# Patient Record
Sex: MALE | Race: WHITE | NOT HISPANIC OR LATINO
[De-identification: names, ages, dates, MRNs, and addresses within clinical notes are randomized per-mention and may not be internally consistent; named-entity substitution may affect disease eponyms.]

---

## 2020-10-01 ENCOUNTER — NON-APPOINTMENT (OUTPATIENT)
Age: 45
End: 2020-10-01

## 2020-10-01 ENCOUNTER — APPOINTMENT (OUTPATIENT)
Dept: INTERNAL MEDICINE | Facility: CLINIC | Age: 45
End: 2020-10-01
Payer: COMMERCIAL

## 2020-10-01 VITALS
SYSTOLIC BLOOD PRESSURE: 112 MMHG | DIASTOLIC BLOOD PRESSURE: 72 MMHG | WEIGHT: 214 LBS | HEIGHT: 67 IN | BODY MASS INDEX: 33.59 KG/M2 | TEMPERATURE: 97 F | OXYGEN SATURATION: 99 % | HEART RATE: 81 BPM

## 2020-10-01 DIAGNOSIS — Z80.0 FAMILY HISTORY OF MALIGNANT NEOPLASM OF DIGESTIVE ORGANS: ICD-10-CM

## 2020-10-01 DIAGNOSIS — Z13.1 ENCOUNTER FOR SCREENING FOR DIABETES MELLITUS: ICD-10-CM

## 2020-10-01 DIAGNOSIS — Z13.0 ENCOUNTER FOR SCREENING FOR DISEASES OF THE BLOOD AND BLOOD-FORMING ORGANS AND CERTAIN DISORDERS INVOLVING THE IMMUNE MECHANISM: ICD-10-CM

## 2020-10-01 DIAGNOSIS — Z12.5 ENCOUNTER FOR SCREENING FOR MALIGNANT NEOPLASM OF PROSTATE: ICD-10-CM

## 2020-10-01 DIAGNOSIS — Z11.59 ENCOUNTER FOR SCREENING FOR OTHER VIRAL DISEASES: ICD-10-CM

## 2020-10-01 DIAGNOSIS — Z11.4 ENCOUNTER FOR SCREENING FOR HUMAN IMMUNODEFICIENCY VIRUS [HIV]: ICD-10-CM

## 2020-10-01 DIAGNOSIS — Z13.21 ENCOUNTER FOR SCREENING FOR NUTRITIONAL DISORDER: ICD-10-CM

## 2020-10-01 PROCEDURE — 93000 ELECTROCARDIOGRAM COMPLETE: CPT | Mod: 59

## 2020-10-01 PROCEDURE — 90715 TDAP VACCINE 7 YRS/> IM: CPT

## 2020-10-01 PROCEDURE — G0447 BEHAVIOR COUNSEL OBESITY 15M: CPT

## 2020-10-01 PROCEDURE — 36415 COLL VENOUS BLD VENIPUNCTURE: CPT

## 2020-10-01 PROCEDURE — 99386 PREV VISIT NEW AGE 40-64: CPT | Mod: 25

## 2020-10-01 PROCEDURE — 99214 OFFICE O/P EST MOD 30 MIN: CPT | Mod: 25

## 2020-10-01 PROCEDURE — 90686 IIV4 VACC NO PRSV 0.5 ML IM: CPT

## 2020-10-01 PROCEDURE — 90471 IMMUNIZATION ADMIN: CPT

## 2020-10-01 PROCEDURE — 90472 IMMUNIZATION ADMIN EACH ADD: CPT

## 2020-10-01 PROCEDURE — G0444 DEPRESSION SCREEN ANNUAL: CPT

## 2020-10-01 PROCEDURE — G0442 ANNUAL ALCOHOL SCREEN 15 MIN: CPT

## 2020-10-01 PROCEDURE — 90732 PPSV23 VACC 2 YRS+ SUBQ/IM: CPT

## 2020-10-02 LAB
25(OH)D3 SERPL-MCNC: 16.4 NG/ML
ALBUMIN SERPL ELPH-MCNC: 4.6 G/DL
ALP BLD-CCNC: 79 U/L
ALT SERPL-CCNC: 38 U/L
ANION GAP SERPL CALC-SCNC: 15 MMOL/L
AST SERPL-CCNC: 20 U/L
BASOPHILS # BLD AUTO: 0.08 K/UL
BASOPHILS NFR BLD AUTO: 1.3 %
BILIRUB SERPL-MCNC: 0.4 MG/DL
BUN SERPL-MCNC: 16 MG/DL
CALCIUM SERPL-MCNC: 9.4 MG/DL
CHLORIDE SERPL-SCNC: 101 MMOL/L
CHOLEST SERPL-MCNC: 227 MG/DL
CHOLEST/HDLC SERPL: 6.8 RATIO
CO2 SERPL-SCNC: 26 MMOL/L
CREAT SERPL-MCNC: 1.1 MG/DL
EOSINOPHIL # BLD AUTO: 0.23 K/UL
EOSINOPHIL NFR BLD AUTO: 3.7 %
ESTIMATED AVERAGE GLUCOSE: 108 MG/DL
GLUCOSE SERPL-MCNC: 96 MG/DL
HBA1C MFR BLD HPLC: 5.4 %
HCT VFR BLD CALC: 44.7 %
HCV AB SER QL: NONREACTIVE
HCV S/CO RATIO: 0.28 S/CO
HDLC SERPL-MCNC: 34 MG/DL
HGB BLD-MCNC: 14.4 G/DL
HIV1+2 AB SPEC QL IA.RAPID: NONREACTIVE
IMM GRANULOCYTES NFR BLD AUTO: 0.3 %
LDLC SERPL CALC-MCNC: 154 MG/DL
LYMPHOCYTES # BLD AUTO: 1.97 K/UL
LYMPHOCYTES NFR BLD AUTO: 31.7 %
MAN DIFF?: NORMAL
MCHC RBC-ENTMCNC: 29.6 PG
MCHC RBC-ENTMCNC: 32.2 GM/DL
MCV RBC AUTO: 91.8 FL
MONOCYTES # BLD AUTO: 0.43 K/UL
MONOCYTES NFR BLD AUTO: 6.9 %
NEUTROPHILS # BLD AUTO: 3.49 K/UL
NEUTROPHILS NFR BLD AUTO: 56.1 %
PLATELET # BLD AUTO: 230 K/UL
POTASSIUM SERPL-SCNC: 4.3 MMOL/L
PROT SERPL-MCNC: 6.7 G/DL
PSA SERPL-MCNC: 1.13 NG/ML
RBC # BLD: 4.87 M/UL
RBC # FLD: 12.4 %
SARS-COV-2 IGG SERPL IA-ACNC: 0.09 INDEX
SARS-COV-2 IGG SERPL QL IA: NEGATIVE
SODIUM SERPL-SCNC: 142 MMOL/L
TRIGL SERPL-MCNC: 196 MG/DL
TSH SERPL-ACNC: 2.94 UIU/ML
WBC # FLD AUTO: 6.22 K/UL

## 2020-10-02 RX ORDER — COLD-HOT PACK
125 MCG EACH MISCELLANEOUS DAILY
Qty: 1 | Refills: 0 | Status: ACTIVE | COMMUNITY
Start: 2020-10-02

## 2020-10-02 NOTE — ASSESSMENT
[FreeTextEntry1] : Plan as above.\par \par The patient was advised to call for any problems or questions.\par \par Return visit in 1 month.

## 2020-10-02 NOTE — PHYSICAL EXAM
[No Acute Distress] : no acute distress [Well Nourished] : well nourished [Well Developed] : well developed [Well-Appearing] : well-appearing [Normal Sclera/Conjunctiva] : normal sclera/conjunctiva [EOMI] : extraocular movements intact [Normal Outer Ear/Nose] : the outer ears and nose were normal in appearance [No JVD] : no jugular venous distention [No Lymphadenopathy] : no lymphadenopathy [Supple] : supple [Thyroid Normal, No Nodules] : the thyroid was normal and there were no nodules present [No Respiratory Distress] : no respiratory distress  [No Accessory Muscle Use] : no accessory muscle use [Clear to Auscultation] : lungs were clear to auscultation bilaterally [Normal Percussion] : the chest was normal to percussion [Normal Rate] : normal rate  [Regular Rhythm] : with a regular rhythm [Normal S1, S2] : normal S1 and S2 [No Murmur] : no murmur heard [No Carotid Bruits] : no carotid bruits [No Abdominal Bruit] : a ~M bruit was not heard ~T in the abdomen [No Varicosities] : no varicosities [Pedal Pulses Present] : the pedal pulses are present [No Edema] : there was no peripheral edema [No Palpable Aorta] : no palpable aorta [No Extremity Clubbing/Cyanosis] : no extremity clubbing/cyanosis [Soft] : abdomen soft [Non Tender] : non-tender [Non-distended] : non-distended [No Masses] : no abdominal mass palpated [No HSM] : no HSM [Normal Bowel Sounds] : normal bowel sounds [No Hernias] : no hernias [Normal Sphincter Tone] : normal sphincter tone [No Mass] : no mass [Normal Supraclavicular Nodes] : no supraclavicular lymphadenopathy [Normal Axillary Nodes] : no axillary lymphadenopathy [Normal Posterior Cervical Nodes] : no posterior cervical lymphadenopathy [Normal Anterior Cervical Nodes] : no anterior cervical lymphadenopathy [Normal Inguinal Nodes] : no inguinal lymphadenopathy [Normal Femoral Nodes] : no femoral lymphadenopathy [No CVA Tenderness] : no CVA  tenderness [No Spinal Tenderness] : no spinal tenderness [No Joint Swelling] : no joint swelling [Grossly Normal Strength/Tone] : grossly normal strength/tone [No Rash] : no rash [Coordination Grossly Intact] : coordination grossly intact [No Focal Deficits] : no focal deficits [Normal Gait] : normal gait [Speech Grossly Normal] : speech grossly normal [Memory Grossly Normal] : memory grossly normal [Normal Affect] : the affect was normal [Alert and Oriented x3] : oriented to person, place, and time [Normal Mood] : the mood was normal [Normal Insight/Judgement] : insight and judgment were intact [FreeTextEntry1] : Prostate smooth without nodules. [de-identified] : Normal testes and genitalia.  No hernias. [de-identified] : Tattoo right upper back.

## 2020-10-02 NOTE — HISTORY OF PRESENT ILLNESS
[FreeTextEntry1] : 45-year-old male here to establish care, for annual comprehensive evaluation and with numerous medical issues. [de-identified] : The patient reports that after he reached the age of 39, he started to have numerous medical issues.  His first complaint today is that for the past 6 months or so he has had upper back, neck and shoulder pain.  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  He has no supports for his upper extremities and essentially holds his arms so that his hands are on the keyboard.\par Another issue that the patient has had on and off for years, but which has been worse in the past few weeks, is a sensation of bloating and cramping in his upper abdomen.  He also has a history of heartburn, which has been worse in the recent past as well.  At one point in the past, he was evaluated by an otolaryngologist who scoped him, but did not look at the lower esophagus.  He did report, however, that he saw evidence of acid reflux changes with the scope.  The patient was not put on a medication at that point, but at some point he took omeprazole for 14 days and got dramatic relief of his symptoms.\par The patient also has seasonal allergies and reports that he at times gets chest tightness and shortness of breath when he exercises outdoors.  The symptoms tend to correlate with his GERD symptoms, but he has never had a history of asthma or actual wheezing.  The symptoms have been occurring for the past few years.\par Several years ago, the patient had to go to the emergency department because of a panic attack.  He reports feeling generally more anxious since the age of 39, which is when his son was born.\par Patient also has a history of hyper cholesterolemia, for which he took a statin but only for short period of time.  Other than his dyspnea on exertion, he has never had any other cardiovascular symptoms.\par The patient lives with his wife and son, but none of them has had any coronavirus symptoms since the onset of the pandemic.

## 2020-10-02 NOTE — HEALTH RISK ASSESSMENT
[0-5] : 0-5 [No] : In the past 12 months have you used drugs other than those required for medical reasons? No [No falls in past year] : Patient reported no falls in the past year [0] : 2) Feeling down, depressed, or hopeless: Not at all (0) [HIV Test offered] : HIV Test offered [Hepatitis C test offered] : Hepatitis C test offered [Fully functional (bathing, dressing, toileting, transferring, walking, feeding)] : Fully functional (bathing, dressing, toileting, transferring, walking, feeding) [Fully functional (using the telephone, shopping, preparing meals, housekeeping, doing laundry, using] : Fully functional and needs no help or supervision to perform IADLs (using the telephone, shopping, preparing meals, housekeeping, doing laundry, using transportation, managing medications and managing finances) [] : No [Audit-CScore] : 0 [[id number]] : 0

## 2020-10-02 NOTE — REVIEW OF SYSTEMS
[Chest Pain] : chest pain [Dyspnea on Exertion] : dyspnea on exertion [Abdominal Pain] : abdominal pain [Heartburn] : heartburn [Anxiety] : anxiety [Negative] : Heme/Lymph [FreeTextEntry9] : Neck and upper back pain as per HPI.

## 2020-11-05 ENCOUNTER — APPOINTMENT (OUTPATIENT)
Dept: GASTROENTEROLOGY | Facility: CLINIC | Age: 45
End: 2020-11-05
Payer: COMMERCIAL

## 2020-11-05 ENCOUNTER — APPOINTMENT (OUTPATIENT)
Dept: INTERNAL MEDICINE | Facility: CLINIC | Age: 45
End: 2020-11-05
Payer: COMMERCIAL

## 2020-11-05 ENCOUNTER — TRANSCRIPTION ENCOUNTER (OUTPATIENT)
Age: 45
End: 2020-11-05

## 2020-11-05 VITALS
BODY MASS INDEX: 33.59 KG/M2 | HEART RATE: 76 BPM | HEIGHT: 67 IN | SYSTOLIC BLOOD PRESSURE: 110 MMHG | WEIGHT: 214 LBS | OXYGEN SATURATION: 98 % | DIASTOLIC BLOOD PRESSURE: 72 MMHG

## 2020-11-05 PROCEDURE — 99214 OFFICE O/P EST MOD 30 MIN: CPT | Mod: 25

## 2020-11-05 PROCEDURE — 99072 ADDL SUPL MATRL&STAF TM PHE: CPT

## 2020-11-05 PROCEDURE — 36415 COLL VENOUS BLD VENIPUNCTURE: CPT

## 2020-11-05 PROCEDURE — G0447 BEHAVIOR COUNSEL OBESITY 15M: CPT

## 2020-11-05 PROCEDURE — 99244 OFF/OP CNSLTJ NEW/EST MOD 40: CPT

## 2020-11-05 NOTE — HISTORY OF PRESENT ILLNESS
[FreeTextEntry1] : Mr. Ramirez is a pleasant 45M h/o asthma, anxiety, HLD who is seen at the request of Dr. Colvin for evaluation of GERD.\par \par States his main symptom has been mild dyspnea, which he states began almost 7 years ago during a stressful period.  Since that time he has also had intermittent mild regurgitation of acidic stomach contents, has noticed this multiple times at night, waking him from sleep.  Has not had significant overt heartburn.  He has had mild dysphagia intermittently for which he previously saw an ENT. Had a TNE, as per his report was told he had reflux related changes in his esophagus, was prescribed omeprazole.\par \par He has gained significant weight over the past few years, most notably during the COVID-19 pandemic. Since that time he thinks his symptoms have worsened.   Was placed on omeprazole 40mg twice daily in early October, since that time his breathing complaints have significantly improved.\par \erin Has had intermittent abdominal cramping and bloating, will have associated loose brown stool or soft brown stool with these symptoms, will have 3-4 BMs daily when he is having cramping and bloating.  Has attributed these symptoms to IBS.\par \par No rectal bleeding, black stool.\par \par Does not smoke or drink.\par \par No family history of any GI malignancies in immediate family relatives.

## 2020-11-05 NOTE — HISTORY OF PRESENT ILLNESS
[FreeTextEntry1] : 45-year-old male here for follow-up of several medical issues. [de-identified] : The patient has been taking his atorvastatin without any side effects.  He has had no cardiovascular symptoms, even with exertion or exercise.  Unfortunately, he states he has not been exercising as much because of the weather recently.  He also is frustrated because he has not lost any weight.\par The patient reports that his breathing has been remarkably better since he has been on omeprazole for presumed GERD.  His GI symptoms, which have been mild, are much better, but his breathing is dramatically improved.  He saw the gastroenterologist earlier today, and is going to be scheduled to have both an EGD and a colonoscopy.\par The patient does have seasonal allergies, for which he uses Claritin on an as-needed basis.  He also has an associated postnasal drip, which he states is not fully resolved with the Claritin.

## 2020-11-05 NOTE — PHYSICAL EXAM
[Normal Sclera/Conjunctiva] : normal sclera/conjunctiva [Normal Outer Ear/Nose] : the outer ears and nose were normal in appearance [Normal Percussion] : the chest was normal to percussion [Pedal Pulses Present] : the pedal pulses are present [No Edema] : there was no peripheral edema [Normal Gait] : normal gait [Speech Grossly Normal] : speech grossly normal [Memory Grossly Normal] : memory grossly normal [Alert and Oriented x3] : oriented to person, place, and time [Normal Mood] : the mood was normal [Normal] : affect was normal and insight and judgment were intact

## 2020-11-05 NOTE — ASSESSMENT
[FreeTextEntry1] : Plan as above.\par \par The patient was advised to call for any problems or questions.\par \par Return visit in 3 months.

## 2020-11-05 NOTE — ASSESSMENT
[FreeTextEntry1] : Will plan on an upper endoscopy for GERD.  Explained risks/benefits/alternatives including not limited to bleeding, infection, perforation, missed lesions, anesthesia complications.  Patient understands and agrees, all questions answered.\par \par Will plan on a colonoscopy for screening.  Explained risks/benefits/alternatives including not limited to bleeding, infection, perforation, missed lesions, anesthesia complications.  Patient understands and agrees, all questions answered.  Will use a split dose miralax/gatorade prep with clears the day prior.\par \par Thank you for referring Mr. DUEÑAS.  Please do not hesitate to call to further discuss his/her care.\par \par Note faxed to requesting MD.\par \par

## 2020-11-06 LAB
CHOLEST SERPL-MCNC: 163 MG/DL
HDLC SERPL-MCNC: 39 MG/DL
LDLC SERPL CALC-MCNC: 101 MG/DL
NONHDLC SERPL-MCNC: 125 MG/DL
TRIGL SERPL-MCNC: 121 MG/DL

## 2021-02-05 ENCOUNTER — NON-APPOINTMENT (OUTPATIENT)
Age: 46
End: 2021-02-05

## 2021-02-05 ENCOUNTER — APPOINTMENT (OUTPATIENT)
Dept: INTERNAL MEDICINE | Facility: CLINIC | Age: 46
End: 2021-02-05

## 2021-08-17 ENCOUNTER — NON-APPOINTMENT (OUTPATIENT)
Age: 46
End: 2021-08-17

## 2021-08-18 ENCOUNTER — LABORATORY RESULT (OUTPATIENT)
Age: 46
End: 2021-08-18

## 2021-08-18 ENCOUNTER — NON-APPOINTMENT (OUTPATIENT)
Age: 46
End: 2021-08-18

## 2021-08-18 ENCOUNTER — APPOINTMENT (OUTPATIENT)
Dept: INTERNAL MEDICINE | Facility: CLINIC | Age: 46
End: 2021-08-18
Payer: COMMERCIAL

## 2021-08-18 VITALS
SYSTOLIC BLOOD PRESSURE: 128 MMHG | OXYGEN SATURATION: 96 % | HEART RATE: 66 BPM | HEIGHT: 67 IN | BODY MASS INDEX: 34.53 KG/M2 | TEMPERATURE: 97.1 F | DIASTOLIC BLOOD PRESSURE: 82 MMHG | WEIGHT: 220 LBS

## 2021-08-18 PROCEDURE — 93000 ELECTROCARDIOGRAM COMPLETE: CPT

## 2021-08-18 PROCEDURE — 99215 OFFICE O/P EST HI 40 MIN: CPT | Mod: 25

## 2021-08-18 PROCEDURE — 36415 COLL VENOUS BLD VENIPUNCTURE: CPT

## 2021-08-18 RX ORDER — OMEPRAZOLE 40 MG/1
40 CAPSULE, DELAYED RELEASE ORAL
Qty: 90 | Refills: 0 | Status: DISCONTINUED | COMMUNITY
Start: 2020-10-01 | End: 2021-08-18

## 2021-08-18 RX ORDER — DOXYCYCLINE HYCLATE 100 MG/1
100 TABLET ORAL TWICE DAILY
Qty: 28 | Refills: 0 | Status: COMPLETED | COMMUNITY
Start: 2021-08-18 | End: 2021-09-01

## 2021-08-18 NOTE — HISTORY OF PRESENT ILLNESS
[FreeTextEntry1] : 46-year-old male complaining of diffuse muscle weakness for several weeks. [de-identified] : The patient reports that several weeks ago he started develop achiness and weakness of his muscles in the arms and legs. He also noticed achiness since in particular joints such as the shoulders, elbows and knees. Also on occasion he gets numbness or tingling in his fingers and occasionally his forearms, especially when he is holding his arms up such as driving a car for prolonged distances. He went to urgent care several days ago, where blood tests were reportedly normal, including a Lyme test. B12 level at that time was at the very low end of the normal range. The patient has been [place] in [place] [facility]. He denies having any fevers, chills or other systemic symptoms. He has never seen any signs of a rash.\par The patient has been intermittent about taking his atorvastatin, but has been taking it recently. He no longer takes omeprazole, but suffers no GERD symptoms since he stopped drinking caffeine. He also reports that his asthma has improved since quitting caffeine.

## 2021-08-18 NOTE — PHYSICAL EXAM
[Normal Sclera/Conjunctiva] : normal sclera/conjunctiva [Normal Outer Ear/Nose] : the outer ears and nose were normal in appearance [Normal Percussion] : the chest was normal to percussion [Pedal Pulses Present] : the pedal pulses are present [No Edema] : there was no peripheral edema [Normal Supraclavicular Nodes] : no supraclavicular lymphadenopathy [No Rash] : no rash [Coordination Grossly Intact] : coordination grossly intact [No Focal Deficits] : no focal deficits [Normal Gait] : normal gait [Speech Grossly Normal] : speech grossly normal [Memory Grossly Normal] : memory grossly normal [Alert and Oriented x3] : oriented to person, place, and time [Normal Mood] : the mood was normal [Normal] : affect was normal and insight and judgment were intact

## 2021-08-18 NOTE — ASSESSMENT
[FreeTextEntry1] : Plan as above.\par \par The patient was advised to call for any problems or questions.\par \par Return visit in 3 weeks if the symptoms do not improve.  Otherwise, the patient will return in October for his annual exam.

## 2021-08-19 LAB
ALBUMIN SERPL ELPH-MCNC: 4.3 G/DL
ALP BLD-CCNC: 72 U/L
ALT SERPL-CCNC: 31 U/L
ANION GAP SERPL CALC-SCNC: 10 MMOL/L
AST SERPL-CCNC: 23 U/L
B BURGDOR IGG+IGM SER QL IB: NORMAL
BASOPHILS # BLD AUTO: 0.06 K/UL
BASOPHILS NFR BLD AUTO: 0.9 %
BILIRUB SERPL-MCNC: 0.5 MG/DL
BUN SERPL-MCNC: 15 MG/DL
CALCIUM SERPL-MCNC: 9.2 MG/DL
CHLORIDE SERPL-SCNC: 104 MMOL/L
CK SERPL-CCNC: 103 U/L
CO2 SERPL-SCNC: 26 MMOL/L
CREAT SERPL-MCNC: 1.07 MG/DL
CRP SERPL-MCNC: <3 MG/L
EOSINOPHIL # BLD AUTO: 0.24 K/UL
EOSINOPHIL NFR BLD AUTO: 3.7 %
ERYTHROCYTE [SEDIMENTATION RATE] IN BLOOD BY WESTERGREN METHOD: 11 MM/HR
FOLATE SERPL-MCNC: 10.4 NG/ML
GLUCOSE SERPL-MCNC: 104 MG/DL
HCT VFR BLD CALC: 43.6 %
HGB BLD-MCNC: 14.2 G/DL
IMM GRANULOCYTES NFR BLD AUTO: 0.3 %
LYMPHOCYTES # BLD AUTO: 2.16 K/UL
LYMPHOCYTES NFR BLD AUTO: 32.9 %
MAN DIFF?: NORMAL
MCHC RBC-ENTMCNC: 29.5 PG
MCHC RBC-ENTMCNC: 32.6 GM/DL
MCV RBC AUTO: 90.5 FL
MONOCYTES # BLD AUTO: 0.56 K/UL
MONOCYTES NFR BLD AUTO: 8.5 %
NEUTROPHILS # BLD AUTO: 3.53 K/UL
NEUTROPHILS NFR BLD AUTO: 53.7 %
PLATELET # BLD AUTO: 224 K/UL
POTASSIUM SERPL-SCNC: 4 MMOL/L
PROT SERPL-MCNC: 6.9 G/DL
RBC # BLD: 4.82 M/UL
RBC # FLD: 12.4 %
SODIUM SERPL-SCNC: 140 MMOL/L
TSH SERPL-ACNC: 3.92 UIU/ML
VIT B12 SERPL-MCNC: 237 PG/ML
WBC # FLD AUTO: 6.57 K/UL

## 2021-08-23 LAB — METHYLMALONATE SERPL-SCNC: 213 NMOL/L

## 2021-09-09 ENCOUNTER — TRANSCRIPTION ENCOUNTER (OUTPATIENT)
Age: 46
End: 2021-09-09

## 2021-09-10 ENCOUNTER — APPOINTMENT (OUTPATIENT)
Dept: INTERNAL MEDICINE | Facility: CLINIC | Age: 46
End: 2021-09-10
Payer: COMMERCIAL

## 2021-09-10 VITALS
OXYGEN SATURATION: 99 % | HEART RATE: 72 BPM | TEMPERATURE: 98.7 F | HEIGHT: 67 IN | DIASTOLIC BLOOD PRESSURE: 80 MMHG | SYSTOLIC BLOOD PRESSURE: 130 MMHG | BODY MASS INDEX: 35.47 KG/M2 | WEIGHT: 226 LBS

## 2021-09-10 PROCEDURE — 90686 IIV4 VACC NO PRSV 0.5 ML IM: CPT

## 2021-09-10 PROCEDURE — G0008: CPT

## 2021-09-10 PROCEDURE — 99213 OFFICE O/P EST LOW 20 MIN: CPT | Mod: 25

## 2021-09-10 RX ORDER — ATORVASTATIN CALCIUM 40 MG/1
40 TABLET, FILM COATED ORAL
Qty: 90 | Refills: 1 | Status: DISCONTINUED | COMMUNITY
Start: 2020-10-02 | End: 2021-09-10

## 2021-09-10 NOTE — ASSESSMENT
[FreeTextEntry1] : Plan as above.\par \par The patient was advised to call for any problems or questions.\par \par Return visit as scheduled at the end of October.

## 2021-09-10 NOTE — PHYSICAL EXAM
[Normal Sclera/Conjunctiva] : normal sclera/conjunctiva [Normal Outer Ear/Nose] : the outer ears and nose were normal in appearance [Normal Percussion] : the chest was normal to percussion [No Edema] : there was no peripheral edema [No Rash] : no rash [Coordination Grossly Intact] : coordination grossly intact [No Focal Deficits] : no focal deficits [Normal Gait] : normal gait [Normal] : affect was normal and insight and judgment were intact

## 2021-09-10 NOTE — HISTORY OF PRESENT ILLNESS
[FreeTextEntry1] : 46-year-old male here for follow-up of myalgias. [de-identified] : The patient took full course of doxycycline and reports that it had no beneficial effect on his myalgias.  In fact, he reports that for the past week or so his myalgias have been seemingly increasing.  He has had no fever or other systemic symptoms.  He also has had no cardiovascular symptoms, even with exertion or exercise.

## 2021-10-29 ENCOUNTER — APPOINTMENT (OUTPATIENT)
Dept: INTERNAL MEDICINE | Facility: CLINIC | Age: 46
End: 2021-10-29

## 2021-10-29 ENCOUNTER — NON-APPOINTMENT (OUTPATIENT)
Age: 46
End: 2021-10-29

## 2022-04-11 PROBLEM — Z11.59 SCREENING FOR VIRAL DISEASE: Status: RESOLVED | Noted: 2020-10-01 | Resolved: 2020-10-08

## 2022-07-24 ENCOUNTER — RESULT REVIEW (OUTPATIENT)
Age: 47
End: 2022-07-24

## 2022-08-19 ENCOUNTER — APPOINTMENT (OUTPATIENT)
Dept: FAMILY MEDICINE | Facility: CLINIC | Age: 47
End: 2022-08-19

## 2022-08-19 VITALS
HEIGHT: 67 IN | HEART RATE: 85 BPM | WEIGHT: 225 LBS | BODY MASS INDEX: 35.31 KG/M2 | RESPIRATION RATE: 16 BRPM | DIASTOLIC BLOOD PRESSURE: 88 MMHG | OXYGEN SATURATION: 99 % | SYSTOLIC BLOOD PRESSURE: 130 MMHG | TEMPERATURE: 98.6 F

## 2022-08-19 PROCEDURE — 99203 OFFICE O/P NEW LOW 30 MIN: CPT

## 2022-12-09 ENCOUNTER — APPOINTMENT (OUTPATIENT)
Dept: FAMILY MEDICINE | Facility: CLINIC | Age: 47
End: 2022-12-09

## 2023-03-24 ENCOUNTER — APPOINTMENT (OUTPATIENT)
Dept: FAMILY MEDICINE | Facility: CLINIC | Age: 48
End: 2023-03-24

## 2023-04-03 ENCOUNTER — APPOINTMENT (OUTPATIENT)
Dept: FAMILY MEDICINE | Facility: CLINIC | Age: 48
End: 2023-04-03
Payer: SELF-PAY

## 2023-04-03 VITALS
BODY MASS INDEX: 33.74 KG/M2 | DIASTOLIC BLOOD PRESSURE: 70 MMHG | TEMPERATURE: 100.8 F | WEIGHT: 215 LBS | OXYGEN SATURATION: 98 % | SYSTOLIC BLOOD PRESSURE: 116 MMHG | HEART RATE: 100 BPM | HEIGHT: 67 IN

## 2023-04-03 DIAGNOSIS — J06.9 ACUTE UPPER RESPIRATORY INFECTION, UNSPECIFIED: ICD-10-CM

## 2023-04-03 PROCEDURE — 99214 OFFICE O/P EST MOD 30 MIN: CPT

## 2023-04-03 RX ORDER — FLUTICASONE PROPIONATE 50 UG/1
50 SPRAY, METERED NASAL DAILY
Qty: 1 | Refills: 0 | Status: ACTIVE | COMMUNITY
Start: 2023-04-03 | End: 1900-01-01

## 2023-04-04 LAB
HMPV RNA SPEC QL NAA+PROBE: DETECTED
RAPID RVP RESULT: DETECTED
SARS-COV-2 RNA PNL RESP NAA+PROBE: NOT DETECTED

## 2023-04-12 PROBLEM — J06.9 ACUTE URI: Status: RESOLVED | Noted: 2023-04-03 | Resolved: 2023-05-03

## 2023-04-12 NOTE — HEALTH RISK ASSESSMENT
[No] : No [No falls in past year] : Patient reported no falls in the past year [0] : 2) Feeling down, depressed, or hopeless: Not at all (0) [Never] : Never [[id number]] : 0

## 2023-05-08 ENCOUNTER — APPOINTMENT (OUTPATIENT)
Dept: FAMILY MEDICINE | Facility: CLINIC | Age: 48
End: 2023-05-08
Payer: COMMERCIAL

## 2023-05-08 VITALS
DIASTOLIC BLOOD PRESSURE: 76 MMHG | WEIGHT: 228 LBS | HEART RATE: 78 BPM | BODY MASS INDEX: 35.79 KG/M2 | HEIGHT: 67 IN | SYSTOLIC BLOOD PRESSURE: 140 MMHG | OXYGEN SATURATION: 98 %

## 2023-05-08 DIAGNOSIS — R74.8 ABNORMAL LEVELS OF OTHER SERUM ENZYMES: ICD-10-CM

## 2023-05-08 DIAGNOSIS — J30.2 OTHER SEASONAL ALLERGIC RHINITIS: ICD-10-CM

## 2023-05-08 DIAGNOSIS — E53.8 DEFICIENCY OF OTHER SPECIFIED B GROUP VITAMINS: ICD-10-CM

## 2023-05-08 DIAGNOSIS — M53.9 DORSOPATHY, UNSPECIFIED: ICD-10-CM

## 2023-05-08 DIAGNOSIS — M48.00 SPINAL STENOSIS, SITE UNSPECIFIED: ICD-10-CM

## 2023-05-08 DIAGNOSIS — M62.830 MUSCLE SPASM OF BACK: ICD-10-CM

## 2023-05-08 DIAGNOSIS — Z87.39 PERSONAL HISTORY OF OTHER DISEASES OF THE MUSCULOSKELETAL SYSTEM AND CONNECTIVE TISSUE: ICD-10-CM

## 2023-05-08 PROCEDURE — 99213 OFFICE O/P EST LOW 20 MIN: CPT | Mod: 25

## 2023-05-08 PROCEDURE — 36415 COLL VENOUS BLD VENIPUNCTURE: CPT

## 2023-05-08 PROCEDURE — G0444 DEPRESSION SCREEN ANNUAL: CPT | Mod: 59

## 2023-05-08 PROCEDURE — 99396 PREV VISIT EST AGE 40-64: CPT | Mod: 25

## 2023-05-08 RX ORDER — BENZONATATE 200 MG/1
200 CAPSULE ORAL 3 TIMES DAILY
Qty: 21 | Refills: 0 | Status: DISCONTINUED | COMMUNITY
Start: 2023-04-03 | End: 2023-05-08

## 2023-05-08 NOTE — HEALTH RISK ASSESSMENT
[Fair] : ~his/her~ current health as fair  [No] : No [0] : 2) Feeling down, depressed, or hopeless: Not at all (0) [PHQ-2 Negative - No further assessment needed] : PHQ-2 Negative - No further assessment needed [Never] : Never [No falls in past year] : Patient reported no falls in the past year [None] : None [With Family] : lives with family [Employed] : employed [Sexually Active] : sexually active [Feels Safe at Home] : Feels safe at home [Fully functional (bathing, dressing, toileting, transferring, walking, feeding)] : Fully functional (bathing, dressing, toileting, transferring, walking, feeding) [Fully functional (using the telephone, shopping, preparing meals, housekeeping, doing laundry, using] : Fully functional and needs no help or supervision to perform IADLs (using the telephone, shopping, preparing meals, housekeeping, doing laundry, using transportation, managing medications and managing finances) [Reports normal functional visual acuity (ie: able to read med bottle)] : Reports normal functional visual acuity [de-identified] : walking [de-identified] : varied [[id number]] : 0 [Change in mental status noted] : No change in mental status noted [High Risk Behavior] : no high risk behavior [Reports changes in hearing] : Reports no changes in hearing [Reports changes in vision] : Reports no changes in vision [Reports changes in dental health] : Reports no changes in dental health [ColonoscopyComments] : Never; overdue [

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
## 2023-05-08 NOTE — PLAN
[FreeTextEntry1] : 47 yo M with HLD, spinal canal stenosis, degenerative disc disease presenting for annual physical\par \par Asthma - well controlled, no exacerbations, exam reassuring\par Degenerative disc disease - asymtpomatic at this time; will establish care with orthopedic spine surgery, Dr. Ramirez, [facility]; recommend physical therapy in future, may need additional MRI imaging to further characterize previous injury extent; reviewed discharge paperwork and CT imaging; scanned into chart\par HLD - labs drawn in office\par Obesity - weight gain discussed; need weight loss, exercise\par Health maintenance - labs drawn in office, will schedule colonoscopy, due for shingles vaccine next year, all questons answered\par

## 2023-05-27 ENCOUNTER — NON-APPOINTMENT (OUTPATIENT)
Age: 48
End: 2023-05-27

## 2023-06-23 ENCOUNTER — NON-APPOINTMENT (OUTPATIENT)
Age: 48
End: 2023-06-23

## 2023-07-17 LAB
25(OH)D3 SERPL-MCNC: 9.3 NG/ML
ALBUMIN SERPL ELPH-MCNC: 4.5 G/DL
ALP BLD-CCNC: 79 U/L
ALT SERPL-CCNC: 142 U/L
ANION GAP SERPL CALC-SCNC: 10 MMOL/L
APPEARANCE: CLEAR
AST SERPL-CCNC: 62 U/L
BACTERIA UR CULT: NORMAL
BACTERIA: NEGATIVE /HPF
BASOPHILS # BLD AUTO: 0.09 K/UL
BASOPHILS NFR BLD AUTO: 1.3 %
BILIRUB SERPL-MCNC: 0.4 MG/DL
BILIRUBIN URINE: NEGATIVE
BLOOD URINE: NEGATIVE
BUN SERPL-MCNC: 18 MG/DL
CALCIUM SERPL-MCNC: 9.3 MG/DL
CAST: 0 /LPF
CHLORIDE SERPL-SCNC: 104 MMOL/L
CHOLEST SERPL-MCNC: 250 MG/DL
CO2 SERPL-SCNC: 27 MMOL/L
COLOR: YELLOW
CREAT SERPL-MCNC: 1.17 MG/DL
EGFR: 76 ML/MIN/1.73M2
EOSINOPHIL # BLD AUTO: 0.28 K/UL
EOSINOPHIL NFR BLD AUTO: 4 %
EPITHELIAL CELLS: 0 /HPF
ESTIMATED AVERAGE GLUCOSE: 114 MG/DL
FERRITIN SERPL-MCNC: 345 NG/ML
FOLATE SERPL-MCNC: 6.3 NG/ML
GLUCOSE QUALITATIVE U: NEGATIVE MG/DL
GLUCOSE SERPL-MCNC: 111 MG/DL
HBA1C MFR BLD HPLC: 5.6 %
HCT VFR BLD CALC: 43.5 %
HCYS SERPL-MCNC: 13.6 UMOL/L
HDLC SERPL-MCNC: 31 MG/DL
HGB BLD-MCNC: 14.6 G/DL
IMM GRANULOCYTES NFR BLD AUTO: 0.3 %
IRON SATN MFR SERPL: 24 %
IRON SERPL-MCNC: 75 UG/DL
KETONES URINE: NEGATIVE MG/DL
LDLC SERPL CALC-MCNC: 162 MG/DL
LEUKOCYTE ESTERASE URINE: NEGATIVE
LYMPHOCYTES # BLD AUTO: 2.2 K/UL
LYMPHOCYTES NFR BLD AUTO: 31.3 %
MAN DIFF?: NORMAL
MCHC RBC-ENTMCNC: 30 PG
MCHC RBC-ENTMCNC: 33.6 GM/DL
MCV RBC AUTO: 89.3 FL
METHYLMALONATE SERPL-SCNC: 217 NMOL/L
MICROSCOPIC-UA: NORMAL
MONOCYTES # BLD AUTO: 0.41 K/UL
MONOCYTES NFR BLD AUTO: 5.8 %
NEUTROPHILS # BLD AUTO: 4.04 K/UL
NEUTROPHILS NFR BLD AUTO: 57.3 %
NITRITE URINE: NEGATIVE
NONHDLC SERPL-MCNC: 219 MG/DL
PH URINE: 7.5
PLATELET # BLD AUTO: 244 K/UL
POTASSIUM SERPL-SCNC: 4.3 MMOL/L
PROT SERPL-MCNC: 7.1 G/DL
PROTEIN URINE: NORMAL MG/DL
RBC # BLD: 4.87 M/UL
RBC # FLD: 12.6 %
RED BLOOD CELLS URINE: 1 /HPF
SODIUM SERPL-SCNC: 140 MMOL/L
SPECIFIC GRAVITY URINE: 1.03
T4 FREE SERPL-MCNC: 1.1 NG/DL
TIBC SERPL-MCNC: 319 UG/DL
TRIGL SERPL-MCNC: 282 MG/DL
TSH SERPL-ACNC: 2.23 UIU/ML
UIBC SERPL-MCNC: 243 UG/DL
UROBILINOGEN URINE: 1 MG/DL
VIT B12 SERPL-MCNC: 316 PG/ML
WBC # FLD AUTO: 7.04 K/UL
WHITE BLOOD CELLS URINE: 0 /HPF

## 2023-07-26 ENCOUNTER — APPOINTMENT (OUTPATIENT)
Dept: INTERNAL MEDICINE | Facility: CLINIC | Age: 48
End: 2023-07-26
Payer: COMMERCIAL

## 2023-07-26 PROCEDURE — 36415 COLL VENOUS BLD VENIPUNCTURE: CPT

## 2023-07-27 LAB
ALBUMIN SERPL ELPH-MCNC: 4.5 G/DL
ALP BLD-CCNC: 83 U/L
ALT SERPL-CCNC: 96 U/L
ANION GAP SERPL CALC-SCNC: 11 MMOL/L
AST SERPL-CCNC: 43 U/L
BILIRUB SERPL-MCNC: 0.4 MG/DL
BUN SERPL-MCNC: 11 MG/DL
CALCIUM SERPL-MCNC: 9.6 MG/DL
CHLORIDE SERPL-SCNC: 103 MMOL/L
CO2 SERPL-SCNC: 28 MMOL/L
CREAT SERPL-MCNC: 1.13 MG/DL
EGFR: 80 ML/MIN/1.73M2
GLUCOSE SERPL-MCNC: 106 MG/DL
POTASSIUM SERPL-SCNC: 4.4 MMOL/L
PROT SERPL-MCNC: 7.1 G/DL
SODIUM SERPL-SCNC: 141 MMOL/L

## 2023-09-07 ENCOUNTER — APPOINTMENT (OUTPATIENT)
Dept: GASTROENTEROLOGY | Facility: CLINIC | Age: 48
End: 2023-09-07
Payer: COMMERCIAL

## 2023-09-07 DIAGNOSIS — K58.9 IRRITABLE BOWEL SYNDROME W/OUT DIARRHEA: ICD-10-CM

## 2023-09-07 DIAGNOSIS — K64.8 OTHER HEMORRHOIDS: ICD-10-CM

## 2023-09-07 DIAGNOSIS — Z12.11 ENCOUNTER FOR SCREENING FOR MALIGNANT NEOPLASM OF COLON: ICD-10-CM

## 2023-09-07 PROCEDURE — 99214 OFFICE O/P EST MOD 30 MIN: CPT

## 2023-09-07 NOTE — ASSESSMENT
[FreeTextEntry1] :   1. GERD:  well  -  controlled,  no ht burn,  dysphagia,  throat clear * ? LPR,  r/o erosive dis, EE, stricture/Ring, Barretts  Recommend:  * Anti-reflux diet & life-style changes reviewed & re-emphasized.   * HOB elevation /  Bedge use emphasized * Weight reduction & regular exercise emphasized * No  need for pH Monitor,  Manometry, or  Esophagram  * No  need for ENT  eval/F/U,  * No  need for  Surgical  eval   *  for  EGD: --for Barretts screening / surveillance needed        2. NAFLD --> to be evaluated      would r/o HepB,C,CELESTINA,ASMA, AMA serologies      repeat LFTs, & Insulin level  Recommend:  * Low Fat / Low Carbohydrate diet was reviewed and emphasized  * avoid  alcohol and other potential hepatoxins was emphasized * Formal wt. reduction program & regular exercise was emphasized * Tight glycemic control was  emphasized   * Anti-oxidants: Omega-3A's  3 grams/D, Vit E 800 IU/D, Vit C 1000mg/D,  Vit D 2000 IU/D  was  emphasized * Monitor LFTs, FBG, HgbA1c, Lipids :    most recent labs were reviewed and diet/ treatment changes were discussed * Abd Sonogram--   most recent imaging findings were  reviewed-->   new Rx given Fibroscan--> after abd sono      3. Probable  Irritable Bowel Syndrome:  well - controlled. currently no  pain,  constipation,  diarrhea,  bloat Recommend:  * Diet: moderate  Fiber,  Low Fat & Lactose free, Low FODMAPs--was reviewed & emphasized * Daily fluid intake was reviewed : 6  --  8 cups of decaffeinated fluid daily was emphasized * Regular aerobic exercise was emphasized * Probiotics  1  daily * Neuromodulation: not currently required     4. Hemorrhoids:  well - controlled.  No pain,  swelling,  itch,  bleeding * Discussed  the  potential complications of thrombosis,  pain,  infection,  swelling, itching,  bleeding --none recently Recommendations:  * Moderate- Fiber Diet was reviewed and emphasized * 6  --  8 cups of decaffeinated fluid daily was emphasized  * Sitz Bathes as needed ,  No:  Anusol HC  Suppos / Cream  SC BID -- was needed * No:  Tucks BID,  Balneol Lotion,   Calmoseptine Oint -- was needed ;    can use  Prep H prn * No:  need for  Colorectal surgical evaluation for possible ablation        5. Colorectal  Neoplasia  Screening:  to be evaluated   Utilizing teaching posters and anatomical models the following were discussed and emphasized with the patient in detail:  * Discussed the pre-malignant potential of polyps * Discussed the importance of f/u surveillance / screening colonoscopy  * moderate- Fiber Diet was reviewed and emphasized * Anti-oxidants and ASA/NSAID Therapy emphasized * Given age > 40-51 yo & +FH Colon Ca  * Recommend Colonoscopy  to  R/O  Colonic Neoplasia-- in 2023       Informed Consent: * The risks & Benefits of  Colonoscopy were discussed w patient. * This included but was not limited to perforation, bleeding, sedation /med rxns, missed lesions possibly requiring surgery, blood transfusions, antibiotics & CPR/Intubation. * Pt. understands & agrees to the procedures. The following instructions in regards to the prep and medically essential ( cardiac, pulmonary, sz, psych, endocrine)  pre-op medication administration was reviewed and emphasized with the patient .  * Pt. advised to D/C  ASA/NSAIDs  7  Days   PTP. * [ +++ ]  Dulcolax / Miralax / Mag. Citrate ,  [     ] Prepopik/ Clenpiq ,  [     ] Osmo Prep,  [    ] GoLytely,  prep. reviewed w Pt. * Hold  [           ] AM of procedure. * Hold  [           ] PM  before procedure. * Take  [           ] PM  before procedure. * Take  [           ] AM of procedure.

## 2023-09-07 NOTE — HISTORY OF PRESENT ILLNESS
[FreeTextEntry1] :    This HPI  reflects a summary and review of records : including previous and most recent  Labs, body imaging, consults and progress notes, operative and pathology reports, EKG reports, ED records, found in [facility], [facility],  [facility] and any additional records brought in by  the patient at the time of the visit.   PCP:  Burak  47 yo M w h/o  Obesity, Asthma, HLD, Cervical Disc Dis, Allergies, Vit D defic + H/O Snoring, no DTF,BMI=36 ,neck=17,STOP-Bang= 4    ,Mallaampatti=4 ? IBS--> h/o intermittnet Lower cramping/bloat w 3-4 loose BMs/D GERD w ? LPR/RAD, Hemorrhoids NAFLD--on 7/2021 Abd sono: diffuse incr echogenicity of Liver, mild splenomegaly  11/5/2020 Dr. li--> wt gain w Regurg at pm, maybe related to dyspnea, mild intermittent dysphagia Rec EGD and screening colonoscopy but not done.  9/7/23     Today:  Feeling well, no c/o , CP, SOB/ DARBY, Cough, Wheeze, Palpitations, edema    Most recent labs and imaging reviewed w patient: cbc, bmet, HgbA1c--> wnl  ,LDL--162, HDL=31, QL=731,  ,Vit. D--9.3; ALT=142, AST=62, ALP=79, Ismayssl=975 7/27/23 : ALT=96, AST=43, ALP=83  9/7/23   Today: Pt states he changed his diet and cut out Caffeine and his GERD resolved                          Presently w no ht burn, dysphagia, throat clearing, cough, sob                          BMs us # 4-5, if stressed can get frequent, no blood or mucous  * Abd pain-->no * Nausea--> no * Vomit--> no * Early satiety--> no * Belching--> no * Hiccups--> no * Regurgitation--> no * Acid Taste / Water Brash--> no * Ht burn--> no * Dysphagia--> no * Throat Clearing--> no * Hoarseness--> no * Post-Nasal Drip--> no * Congestion--> no * Globus--> no * Cough--> no * Wheeze / PC-> -no * BMs: # 4-5  qd * Constipation--> no * Diarrhea--> no * Bloating--> no * Strain on Defecation--> no * Incompl Evac--> no * Flatulence--> no * Gurgling--> no * Melena--> no * BPBPR-> -no * Anorexia--> no * Wt. Loss--> no

## 2023-09-08 LAB
FERRITIN SERPL-MCNC: 293 NG/ML
GGT SERPL-CCNC: 22 U/L
HBV CORE IGM SER QL: NONREACTIVE
HBV SURFACE AG SER QL: NONREACTIVE
HCV AB SER QL: NONREACTIVE
HCV S/CO RATIO: 0.29 S/CO
INSULIN SERPL-MCNC: 87.5 UU/ML

## 2023-09-09 LAB
MITOCHONDRIA AB SER IF-ACNC: NORMAL
SMOOTH MUSCLE AB SER QL IF: ABNORMAL

## 2023-09-11 LAB — ANACR T: NEGATIVE

## 2023-09-14 ENCOUNTER — RESULT REVIEW (OUTPATIENT)
Age: 48
End: 2023-09-14

## 2023-09-14 ENCOUNTER — NON-APPOINTMENT (OUTPATIENT)
Age: 48
End: 2023-09-14

## 2023-11-02 ENCOUNTER — RESULT REVIEW (OUTPATIENT)
Age: 48
End: 2023-11-02

## 2023-11-03 ENCOUNTER — APPOINTMENT (OUTPATIENT)
Dept: GASTROENTEROLOGY | Facility: HOSPITAL | Age: 48
End: 2023-11-03

## 2023-11-03 ENCOUNTER — NON-APPOINTMENT (OUTPATIENT)
Age: 48
End: 2023-11-03

## 2024-01-04 ENCOUNTER — RESULT REVIEW (OUTPATIENT)
Age: 49
End: 2024-01-04

## 2024-01-04 ENCOUNTER — APPOINTMENT (OUTPATIENT)
Dept: FAMILY MEDICINE | Facility: CLINIC | Age: 49
End: 2024-01-04
Payer: COMMERCIAL

## 2024-01-04 ENCOUNTER — NON-APPOINTMENT (OUTPATIENT)
Age: 49
End: 2024-01-04

## 2024-01-04 VITALS
BODY MASS INDEX: 35.79 KG/M2 | WEIGHT: 228 LBS | SYSTOLIC BLOOD PRESSURE: 120 MMHG | DIASTOLIC BLOOD PRESSURE: 80 MMHG | OXYGEN SATURATION: 98 % | HEIGHT: 67 IN | HEART RATE: 80 BPM

## 2024-01-04 DIAGNOSIS — M54.50 LOW BACK PAIN, UNSPECIFIED: ICD-10-CM

## 2024-01-04 PROCEDURE — 99214 OFFICE O/P EST MOD 30 MIN: CPT

## 2024-01-07 NOTE — PHYSICAL EXAM
[Normal Sphincter Tone] : normal sphincter tone [Prostate Enlargement] : the prostate was not enlarged [Prostate Tenderness] : the prostate was not tender [No Prostate Nodules] : no prostate nodules [Alert and Oriented x3] : oriented to person, place, and time [Normal] : affect was normal and insight and judgment were intact

## 2024-01-08 NOTE — HISTORY OF PRESENT ILLNESS
[FreeTextEntry8] : 48 year old male here for acute concern of new onset erectile dysfunction.  History of cervical spinal stenosis and lumbar disc disease.  10+ years ago had history of low testosterone and received injections for several months.  For the past 3-4 months with intermittent penile/pelvic numbness after extended sitting. Also with lower back pain starting around same time.  Over past 2-3 weeks had several episodes of difficulty getting and maintaining erections, decreased ejaculations, urinary frequency.  Some penile discomfort, mild pain with ejaculation and decreased expulsion causing dribbling.  Aware he is overweight and not physically active.  Sexually active with wife, condoms, vaginal sex, and masturbation.  Denies fevers, hematuria.  Father with history of prostate cancer in age 60s.

## 2024-01-08 NOTE — ASSESSMENT
[FreeTextEntry1] : Differentials include lumbar spinal stenosis, UTI, STI, BPH, early onset prostate cancer given family history. Urine and labs drawn to further evaluate.  Xray and MRI to monitor lumbar disc disease.  Return precautions.

## 2024-01-12 LAB
APPEARANCE: CLEAR
BACTERIA UR CULT: NORMAL
BILIRUBIN URINE: NEGATIVE
BLOOD URINE: NEGATIVE
C TRACH RRNA SPEC QL NAA+PROBE: NOT DETECTED
COLOR: YELLOW
GLUCOSE QUALITATIVE U: NEGATIVE MG/DL
KETONES URINE: ABNORMAL MG/DL
LEUKOCYTE ESTERASE URINE: NEGATIVE
N GONORRHOEA RRNA SPEC QL NAA+PROBE: NOT DETECTED
NITRITE URINE: NEGATIVE
PH URINE: 7
PROTEIN URINE: NORMAL MG/DL
PSA FREE FLD-MCNC: 19 %
PSA FREE SERPL-MCNC: 0.59 NG/ML
PSA SERPL-MCNC: 3.12 NG/ML
SOURCE AMPLIFICATION: NORMAL
SPECIFIC GRAVITY URINE: 1.03
T PALLIDUM AB SER QL IA: NEGATIVE
TESTOST FREE SERPL-MCNC: 8.7 PG/ML
TESTOST SERPL-MCNC: 257 NG/DL
UROBILINOGEN URINE: 1 MG/DL

## 2024-02-10 ENCOUNTER — RESULT REVIEW (OUTPATIENT)
Age: 49
End: 2024-02-10

## 2024-03-14 ENCOUNTER — APPOINTMENT (OUTPATIENT)
Dept: FAMILY MEDICINE | Facility: CLINIC | Age: 49
End: 2024-03-14
Payer: COMMERCIAL

## 2024-03-14 ENCOUNTER — LABORATORY RESULT (OUTPATIENT)
Age: 49
End: 2024-03-14

## 2024-03-14 VITALS
BODY MASS INDEX: 35.79 KG/M2 | HEART RATE: 80 BPM | OXYGEN SATURATION: 98 % | WEIGHT: 228 LBS | SYSTOLIC BLOOD PRESSURE: 120 MMHG | HEIGHT: 67 IN | DIASTOLIC BLOOD PRESSURE: 70 MMHG

## 2024-03-14 DIAGNOSIS — R79.89 OTHER SPECIFIED ABNORMAL FINDINGS OF BLOOD CHEMISTRY: ICD-10-CM

## 2024-03-14 PROCEDURE — 36415 COLL VENOUS BLD VENIPUNCTURE: CPT

## 2024-03-14 PROCEDURE — 99214 OFFICE O/P EST MOD 30 MIN: CPT

## 2024-03-14 PROCEDURE — G2211 COMPLEX E/M VISIT ADD ON: CPT

## 2024-03-17 PROBLEM — R79.89 ELEVATED LFTS: Status: ACTIVE | Noted: 2023-07-21

## 2024-03-17 LAB
25(OH)D3 SERPL-MCNC: 10.5 NG/ML
ALBUMIN SERPL ELPH-MCNC: 4.7 G/DL
ALP BLD-CCNC: 69 U/L
ALT SERPL-CCNC: 66 U/L
ANION GAP SERPL CALC-SCNC: 14 MMOL/L
AST SERPL-CCNC: 39 U/L
BILIRUB SERPL-MCNC: 0.5 MG/DL
BUN SERPL-MCNC: 17 MG/DL
CALCIUM SERPL-MCNC: 9.6 MG/DL
CHLORIDE SERPL-SCNC: 101 MMOL/L
CHOLEST SERPL-MCNC: 268 MG/DL
CO2 SERPL-SCNC: 25 MMOL/L
CREAT SERPL-MCNC: 1.11 MG/DL
EGFR: 81 ML/MIN/1.73M2
GLUCOSE SERPL-MCNC: 85 MG/DL
HDLC SERPL-MCNC: 36 MG/DL
LDLC SERPL CALC-MCNC: 209 MG/DL
NONHDLC SERPL-MCNC: 232 MG/DL
POTASSIUM SERPL-SCNC: 4.4 MMOL/L
PROT SERPL-MCNC: 7.2 G/DL
SODIUM SERPL-SCNC: 140 MMOL/L
TRIGL SERPL-MCNC: 128 MG/DL

## 2024-03-17 NOTE — HEALTH RISK ASSESSMENT
[No] : No [No falls in past year] : Patient reported no falls in the past year [Never] : Never [3] : 1) Little interest or pleasure doing things for nearly every day (3) [2] : 2) Feeling down, depressed, or hopeless for more than half of the days (2) [[id number]] : 5

## 2024-03-17 NOTE — HISTORY OF PRESENT ILLNESS
[FreeTextEntry1] : follow up [de-identified] : 50 yo M with HLD, vitamin D deficiency, obesity, fatty liver presenting for follow up. Additionally reports he has been having issues with erectile dysfunction. Admits to masturbating multiple times in a day but lack of persistence of erection. Has morning erections. Reports he has made strides in last month to lose weight and monitor his diet more. Additionally, undergoing some stress in marriage and has developed some anxiety, interested in establishing care with therapist.

## 2024-03-17 NOTE — PLAN
[FreeTextEntry1] : 50 yo M with HLD, vitamin D deficiency, obesity, fatty liver presenting for follow up. Additionally reports he has been having issues with erectile dysfunction. Erectile dysfunction - may be multifactorial, HLD and obesity, will start viagra, monitor improvement, referral to urology, decrease masturbation to help improve erections HLD - labs repeat drawn, consider statin if remains elevated, weight loss stressed Anxiety - surrounding marriage, would like therapist, seeking currently, I am in agreement with plan, no need for pharmacotherapy at this time All questions answered Labs drawn in office Follow up annual physical Constricted Labile/Constricted

## 2024-04-02 ENCOUNTER — APPOINTMENT (OUTPATIENT)
Dept: UROLOGY | Facility: CLINIC | Age: 49
End: 2024-04-02
Payer: COMMERCIAL

## 2024-04-02 VITALS
HEART RATE: 91 BPM | SYSTOLIC BLOOD PRESSURE: 110 MMHG | DIASTOLIC BLOOD PRESSURE: 78 MMHG | HEIGHT: 67 IN | BODY MASS INDEX: 35.31 KG/M2 | WEIGHT: 225 LBS

## 2024-04-02 DIAGNOSIS — Z80.42 FAMILY HISTORY OF MALIGNANT NEOPLASM OF PROSTATE: ICD-10-CM

## 2024-04-02 PROCEDURE — 99204 OFFICE O/P NEW MOD 45 MIN: CPT

## 2024-04-02 RX ORDER — SILDENAFIL 100 MG/1
100 TABLET, FILM COATED ORAL
Qty: 20 | Refills: 1 | Status: ACTIVE | COMMUNITY
Start: 2024-03-17 | End: 1900-01-01

## 2024-04-02 NOTE — ASSESSMENT
[FreeTextEntry1] : Patient is a 49-year-old male referred to me for hypogonadism and erectile dysfunction.  Over 10 years ago he had a 6 to 8-month course of IM testosterone injections that helped at the time but he stopped because he felt he no longer needed.  Over the last 2 to 3 months he has noted a decrease in his inability to obtain an erection associated with decreased desire and decreased sensation with ejaculation.  He has had no change in his medical condition.  He has no cardiopulmonary disease and is not on nitrates.  His PCP checked a testosterone level which came back at 250 but was drawn at 4:30 PM.  In addition, he has a family history of prostate cancer his father was diagnosed in his 60s and underwent a radical prostatectomy.  He denies any gross hematuria, dysuria or pelvic pain.  He has had no change in his voiding pattern.  His digital rectal exam today is normal.  Assessment and plan 1.  Erectile dysfunction Will start sildenafil 100 mg as needed.  I discussed usage and side effects and he understands and wishes to proceed. 2.  Low testosterone I will check a testosterone today since it was drawn between 830 and 9 in the morning.  I will call him with the result. 3.  Elevated PSA for age 3.  Family history of prostate cancer I will recheck the PSA today but if it is above 2 I will get an MRI of the prostate.  I discussed this with the patient.  I would like him to make a follow-up in 2 months.

## 2024-04-02 NOTE — PHYSICAL EXAM
[General Appearance - Well Developed] : well developed [Normal Appearance] : normal appearance [General Appearance - In No Acute Distress] : no acute distress [Respiration, Rhythm And Depth] : normal respiratory rhythm and effort [Abdomen Soft] : soft [Abdomen Hernia] : no hernia was discovered [Urethral Meatus] : meatus normal [Penis Abnormality] : normal circumcised penis [Scrotum] : the scrotum was normal [Testes Tenderness] : no tenderness of the testes [Epididymis] : the epididymides were normal [Anus Abnormality] : the anus and perineum were normal [Testes Mass (___cm)] : there were no testicular masses [Prostate Enlargement] : the prostate was not enlarged [Prostate Tenderness] : the prostate was not tender [Prostate Size ___ gm] : prostate size [unfilled] gm [No Prostate Nodules] : no prostate nodules [] : no rash [Oriented To Time, Place, And Person] : oriented to person, place, and time [Inguinal Lymph Nodes Enlarged Bilaterally] : inguinal

## 2024-04-03 LAB
PSA SERPL-MCNC: 2.21 NG/ML
TESTOST SERPL-MCNC: 286 NG/DL

## 2024-04-16 ENCOUNTER — APPOINTMENT (OUTPATIENT)
Dept: UROLOGY | Facility: CLINIC | Age: 49
End: 2024-04-16
Payer: COMMERCIAL

## 2024-04-16 PROCEDURE — 99441: CPT

## 2024-05-09 ENCOUNTER — APPOINTMENT (OUTPATIENT)
Dept: FAMILY MEDICINE | Facility: CLINIC | Age: 49
End: 2024-05-09
Payer: COMMERCIAL

## 2024-05-09 VITALS
DIASTOLIC BLOOD PRESSURE: 80 MMHG | HEART RATE: 82 BPM | HEIGHT: 67 IN | BODY MASS INDEX: 35.47 KG/M2 | OXYGEN SATURATION: 98 % | SYSTOLIC BLOOD PRESSURE: 130 MMHG | WEIGHT: 226 LBS | TEMPERATURE: 99.6 F

## 2024-05-09 DIAGNOSIS — Z00.00 ENCOUNTER FOR GENERAL ADULT MEDICAL EXAMINATION W/OUT ABNORMAL FINDINGS: ICD-10-CM

## 2024-05-09 DIAGNOSIS — J45.909 UNSPECIFIED ASTHMA, UNCOMPLICATED: ICD-10-CM

## 2024-05-09 DIAGNOSIS — E78.5 HYPERLIPIDEMIA, UNSPECIFIED: ICD-10-CM

## 2024-05-09 DIAGNOSIS — E55.9 VITAMIN D DEFICIENCY, UNSPECIFIED: ICD-10-CM

## 2024-05-09 DIAGNOSIS — R06.83 SNORING: ICD-10-CM

## 2024-05-09 DIAGNOSIS — Z23 ENCOUNTER FOR IMMUNIZATION: ICD-10-CM

## 2024-05-09 DIAGNOSIS — K21.9 GASTRO-ESOPHAGEAL REFLUX DISEASE W/OUT ESOPHAGITIS: ICD-10-CM

## 2024-05-09 DIAGNOSIS — F41.9 ANXIETY DISORDER, UNSPECIFIED: ICD-10-CM

## 2024-05-09 PROCEDURE — 90715 TDAP VACCINE 7 YRS/> IM: CPT

## 2024-05-09 PROCEDURE — 99396 PREV VISIT EST AGE 40-64: CPT | Mod: 25

## 2024-05-09 PROCEDURE — 36415 COLL VENOUS BLD VENIPUNCTURE: CPT

## 2024-05-09 PROCEDURE — 90471 IMMUNIZATION ADMIN: CPT

## 2024-05-12 PROBLEM — F41.9 ANXIETY: Status: ACTIVE | Noted: 2020-10-01

## 2024-05-12 PROBLEM — J45.909 ASTHMA: Status: ACTIVE | Noted: 2020-10-01

## 2024-05-12 PROBLEM — R06.83 SNORING: Status: ACTIVE | Noted: 2024-05-12

## 2024-05-12 PROBLEM — Z00.00 ENCOUNTER FOR PREVENTIVE HEALTH EXAMINATION: Status: ACTIVE | Noted: 2020-09-30

## 2024-05-12 PROBLEM — Z23 NEED FOR SHINGLES VACCINE: Status: ACTIVE | Noted: 2024-05-12

## 2024-05-12 PROBLEM — K21.9 GERD (GASTROESOPHAGEAL REFLUX DISEASE): Status: ACTIVE | Noted: 2020-10-01

## 2024-05-12 PROBLEM — E78.5 HYPERLIPIDEMIA: Status: ACTIVE | Noted: 2020-10-01

## 2024-05-12 PROBLEM — E55.9 VITAMIN D DEFICIENCY: Status: ACTIVE | Noted: 2020-10-02

## 2024-05-12 LAB
25(OH)D3 SERPL-MCNC: 10.5 NG/ML
ALBUMIN SERPL ELPH-MCNC: 4.3 G/DL
ALP BLD-CCNC: 74 U/L
ALT SERPL-CCNC: 69 U/L
ANION GAP SERPL CALC-SCNC: 10 MMOL/L
APPEARANCE: CLEAR
AST SERPL-CCNC: 62 U/L
BASOPHILS # BLD AUTO: 0.08 K/UL
BASOPHILS NFR BLD AUTO: 1.2 %
BILIRUB SERPL-MCNC: 0.4 MG/DL
BILIRUBIN URINE: NEGATIVE
BLOOD URINE: NEGATIVE
BUN SERPL-MCNC: 16 MG/DL
CALCIUM SERPL-MCNC: 9.4 MG/DL
CHLORIDE SERPL-SCNC: 106 MMOL/L
CHOLEST SERPL-MCNC: 241 MG/DL
CO2 SERPL-SCNC: 28 MMOL/L
COLOR: YELLOW
CREAT SERPL-MCNC: 1.18 MG/DL
EGFR: 75 ML/MIN/1.73M2
EOSINOPHIL # BLD AUTO: 0.23 K/UL
EOSINOPHIL NFR BLD AUTO: 3.3 %
ESTIMATED AVERAGE GLUCOSE: 111 MG/DL
GLUCOSE QUALITATIVE U: NEGATIVE MG/DL
GLUCOSE SERPL-MCNC: 105 MG/DL
HBA1C MFR BLD HPLC: 5.5 %
HCT VFR BLD CALC: 45.3 %
HDLC SERPL-MCNC: 34 MG/DL
HGB BLD-MCNC: 14.8 G/DL
IMM GRANULOCYTES NFR BLD AUTO: 0.1 %
KETONES URINE: NEGATIVE MG/DL
LDLC SERPL CALC-MCNC: 162 MG/DL
LEUKOCYTE ESTERASE URINE: NEGATIVE
LYMPHOCYTES # BLD AUTO: 1.97 K/UL
LYMPHOCYTES NFR BLD AUTO: 28.6 %
MAN DIFF?: NORMAL
MCHC RBC-ENTMCNC: 29.6 PG
MCHC RBC-ENTMCNC: 32.7 GM/DL
MCV RBC AUTO: 90.6 FL
MONOCYTES # BLD AUTO: 0.5 K/UL
MONOCYTES NFR BLD AUTO: 7.2 %
NEUTROPHILS # BLD AUTO: 4.11 K/UL
NEUTROPHILS NFR BLD AUTO: 59.6 %
NITRITE URINE: NEGATIVE
NONHDLC SERPL-MCNC: 207 MG/DL
PH URINE: 5.5
PLATELET # BLD AUTO: 249 K/UL
POTASSIUM SERPL-SCNC: 4.5 MMOL/L
PROT SERPL-MCNC: 6.8 G/DL
PROTEIN URINE: NEGATIVE MG/DL
RBC # BLD: 5 M/UL
RBC # FLD: 12.7 %
SODIUM SERPL-SCNC: 143 MMOL/L
SPECIFIC GRAVITY URINE: 1.02
TRIGL SERPL-MCNC: 241 MG/DL
TSH SERPL-ACNC: 1.7 UIU/ML
UROBILINOGEN URINE: 0.2 MG/DL
WBC # FLD AUTO: 6.9 K/UL

## 2024-05-12 RX ORDER — ERGOCALCIFEROL 1.25 MG/1
1.25 MG CAPSULE, LIQUID FILLED ORAL
Qty: 12 | Refills: 1 | Status: ACTIVE | COMMUNITY
Start: 2024-05-12 | End: 1900-01-01

## 2024-05-12 RX ORDER — ZOSTER VACCINE RECOMBINANT, ADJUVANTED 50 MCG/0.5
50 KIT INTRAMUSCULAR
Qty: 1 | Refills: 1 | Status: ACTIVE | COMMUNITY
Start: 2024-05-12 | End: 1900-01-01

## 2024-05-12 NOTE — HEALTH RISK ASSESSMENT
[No] : No [No falls in past year] : Patient reported no falls in the past year [1] : 2) Feeling down, depressed, or hopeless for several days (1) [Patient reported colonoscopy was normal] : Patient reported colonoscopy was normal [Fully functional (bathing, dressing, toileting, transferring, walking, feeding)] : Fully functional (bathing, dressing, toileting, transferring, walking, feeding) [Fully functional (using the telephone, shopping, preparing meals, housekeeping, doing laundry, using] : Fully functional and needs no help or supervision to perform IADLs (using the telephone, shopping, preparing meals, housekeeping, doing laundry, using transportation, managing medications and managing finances) [Never] : Never [Good] : ~his/her~  mood as  good [0] : 2) Feeling down, depressed, or hopeless: Not at all (0) [PHQ-2 Negative - No further assessment needed] : PHQ-2 Negative - No further assessment needed [de-identified] : walking [de-identified] : regular [[id number]] : 1 [With Family] : lives with family [Employed] : employed [] :  [Feels Safe at Home] : Feels safe at home [Reports changes in hearing] : Reports no changes in hearing [Reports changes in vision] : Reports no changes in vision [ColonoscopyDate] : 11/23

## 2024-05-12 NOTE — HISTORY OF PRESENT ILLNESS
[FreeTextEntry1] : Annual [de-identified] : 50 yo M with obesity, low testosterone, HLD, NAFLD, ED, presenting for routine annual physical. Since last visit, patient seen by urology and monitored, confirmed low testosterone levels, has appt scheduled with endocrinology as well. Reports he feels great, looking forward to losing weight and is being more active and better with foods he is eating. Denies any headaches, chest pain, shortness of breath, nausea/vomiting, diarrhea, constipation, changes in vision.

## 2024-05-12 NOTE — PLAN
[FreeTextEntry1] : 48 yo M with obesity, low testosterone, HLD, NAFLD, ED, presenting for routine annual physical. Since last visit, patient seen by urology and monitored, confirmed low testosterone levels, has appt scheduled with endocrinology as well.  Labs drawn in office Sleep study recommendation provided given obesity, and snoring Tdap vaccine given today Shingrix sent to pharmacy Will call with results All questions answered Follow up endocrinology for low testosterone

## 2024-06-03 ENCOUNTER — RESULT REVIEW (OUTPATIENT)
Age: 49
End: 2024-06-03

## 2024-06-10 ENCOUNTER — APPOINTMENT (OUTPATIENT)
Dept: ENDOCRINOLOGY | Facility: CLINIC | Age: 49
End: 2024-06-10
Payer: COMMERCIAL

## 2024-06-10 VITALS
SYSTOLIC BLOOD PRESSURE: 128 MMHG | OXYGEN SATURATION: 98 % | BODY MASS INDEX: 35.47 KG/M2 | WEIGHT: 226 LBS | DIASTOLIC BLOOD PRESSURE: 85 MMHG | HEIGHT: 67 IN | HEART RATE: 82 BPM

## 2024-06-10 DIAGNOSIS — N52.9 MALE ERECTILE DYSFUNCTION, UNSPECIFIED: ICD-10-CM

## 2024-06-10 DIAGNOSIS — R79.89 OTHER SPECIFIED ABNORMAL FINDINGS OF BLOOD CHEMISTRY: ICD-10-CM

## 2024-06-10 DIAGNOSIS — R97.20 ELEVATED PROSTATE, SPECIFIC ANTIGEN [PSA]: ICD-10-CM

## 2024-06-10 DIAGNOSIS — K76.0 FATTY (CHANGE OF) LIVER, NOT ELSEWHERE CLASSIFIED: ICD-10-CM

## 2024-06-10 DIAGNOSIS — E66.9 OBESITY, UNSPECIFIED: ICD-10-CM

## 2024-06-10 PROCEDURE — 99204 OFFICE O/P NEW MOD 45 MIN: CPT | Mod: 25

## 2024-06-10 PROCEDURE — G2211 COMPLEX E/M VISIT ADD ON: CPT | Mod: NC,1L

## 2024-06-10 NOTE — REASON FOR VISIT
[Initial Evaluation] : an initial evaluation [Hypogonadism] : hypogonadism [Source: ______] : History obtained from [facility] [FreeTextEntry2] : Dr. Sumner

## 2024-06-10 NOTE — HISTORY OF PRESENT ILLNESS
[FreeTextEntry1] : Hypogonadism etiology: unknown Symptoms: Decreased libido, erectile dysfunction.  Prior testosterone use: IM therapy over 10 years ago for less than a year; stopped after some time following symptom resolution.  Previously prescribed testosterone by: unknown Most recent testosterone levels:  April 2024 09:04AM; 286 ng/dL  Previous complications from testosterone replacement (erythrocytosis, dyslipidemia, PSA elevation, transaminitis, thromboembolism): PSA slightly elevated; following with urology History of SHARON: Possible undiagnosed; sleep study recommended.  Chronic headaches, visual field defects, or loss of smell: Denies Gynecomastia: Denies Puberty history: Normal Conceived children: Yes,1. History of chronic kidney or liver disease: No. Fatty liver disease. History of opiate, glucocorticoid, or ketoconazole use: Denies History of infection/trauma to head or testicles: Denies History of chemotherapy or radiotherapy: Denies History of autoimmune disease or HIV: Denies

## 2024-06-10 NOTE — PHYSICAL EXAM
[Alert] : alert [Well Nourished] : well nourished [Healthy Appearance] : healthy appearance [No Acute Distress] : no acute distress [Well Developed] : well developed [Normal Voice/Communication] : normal voice communication [Normal Sclera/Conjunctiva] : normal sclera/conjunctiva [EOMI] : extra ocular movement intact [Normal Hearing] : hearing was normal [No Neck Mass] : no neck mass was observed [No Respiratory Distress] : no respiratory distress [Normal Rate and Effort] : normal respiratory rate and effort [Normal Rate] : heart rate was normal [No Edema] : no peripheral edema [Not Distended] : not distended [No Stigmata of Cushings Syndrome] : no stigmata of Cushings Syndrome [Normal Gait] : normal gait [No Rash] : no rash [Acanthosis Nigricans] : no acanthosis nigricans [Oriented x3] : oriented to person, place, and time [Normal Affect] : the affect was normal [Recent Memory Normal] : recent memory was not impaired [Normal Insight/Judgement] : insight and judgment were intact [Normal Mood] : the mood was normal [Remote Memory Normal] : remote memory was not impaired

## 2024-07-23 ENCOUNTER — APPOINTMENT (OUTPATIENT)
Dept: UROLOGY | Facility: CLINIC | Age: 49
End: 2024-07-23

## 2024-08-30 ENCOUNTER — NON-APPOINTMENT (OUTPATIENT)
Age: 49
End: 2024-08-30

## 2024-09-16 ENCOUNTER — APPOINTMENT (OUTPATIENT)
Dept: OTOLARYNGOLOGY | Facility: CLINIC | Age: 49
End: 2024-09-16

## 2024-10-02 ENCOUNTER — NON-APPOINTMENT (OUTPATIENT)
Age: 49
End: 2024-10-02

## 2024-10-02 NOTE — PHYSICAL EXAM
[Sclera] : the sclera and conjunctiva were normal [None] : no edema [Normal] : normal bowel sounds, non-tender, no masses, soft, no no hepato-splenomegaly [Abnormal Walk] : normal gait [Normal Color / Pigmentation] : normal skin color and pigmentation [Oriented To Time, Place, And Person] : oriented to person, place, and time

## 2024-10-02 NOTE — HISTORY OF PRESENT ILLNESS
[FreeTextEntry1] :    This HPI  reflects a summary and review of records : including previous and most recent  Labs, body imaging, consults and progress notes, operative and pathology reports, EKG reports, ED records, found in [facility], [facility],  [facility] and any additional records brought in by  the patient at the time of the visit.   PCP:  Burak  48 yo M w h/o  Obesity, Asthma, HLD, Cervical Disc Dis, Allergies, Vit D defic + H/O Snoring, no DTF,BMI=35 ,neck=17,STOP-Bang= 4    ,Mallaampatti=4 ? IBS--> h/o intermittnet Lower cramping/bloat w 3-4 loose BMs/D GERD w ? LPR/RAD,  Colon Polyps, + FH Colon Ca, Hemorrhoids NAFLD--on 7/2021 Abd sono: diffuse incr echogenicity of Liver, mild splenomegaly  11/5/2020 Dr. li--> wt gain w Regurg at pm, maybe related to dyspnea, mild intermittent dysphagia Rec EGD and screening colonoscopy but not done.  9/7/23 Init CC:  Pt states he changed his diet and cut out Caffeine and his GERD resolved                          Presently w no ht burn, dysphagia, throat clearing, cough, sob                          BMs us # 4-5, if stressed can get frequent, no blood or mucous Labs: CELESTINA, AMA, ASMA, Ferritin, HCV, HBV--> WNL, Insulin=87, GGTP=22 9/14/23 Abd sono: Liver 15.4cm w increased echogenecity, spleen at 13.1 cm   11/3/23 Colonoscopy: 0.75cm & 1cm sessile ascending colon polyps: tubulovillous ; 0.5cm rectal polyp: Hp ;                                     2nd deg int hemorrhoids 5/9/24: AST=62, ALT=69, ALP=74, ALB=4.3, TB=0.4, QC=556, HDL=34,YGU=966, wAEc4A=4.5 10/31/24     Today:  Feeling well, no c/o , CP, SOB/ DARBY, Cough, Wheeze, Palpitations, edema    Most recent labs and imaging reviewed :   10/31/24   Today:   * Abd pain-->no * Nausea--> no * Vomit--> no * Early satiety--> no * Belching--> no * Hiccups--> no * Regurgitation--> no * Acid Taste / Water Brash--> no * Ht burn--> no * Dysphagia--> no * Throat Clearing--> no * Hoarseness--> no * Post-Nasal Drip--> no * Congestion--> no * Globus--> no * Cough--> no * Wheeze / PC-> -no * BMs: # 4 qd * Constipation--> no * Diarrhea--> no * Bloating--> no * Strain on Defecation--> no * Incompl Evac--> no * Flatulence--> no * Gurgling--> no * Melena--> no * BPBPR-> -no * Anorexia--> no * Wt. Loss--> no

## 2024-10-02 NOTE — ASSESSMENT
[FreeTextEntry1] :  1. GERD:  well  -  controlled,  no ht burn,  dysphagia,  throat clear * ? LPR,  r/o erosive dis, EE, stricture/Ring, Barretts  Recommend:  * Anti-reflux diet & life-style changes reviewed & re-emphasized.   * HOB elevation /  Bedge use emphasized * Weight reduction & regular exercise emphasized * No  need for pH Monitor,  Manometry, or  Esophagram  * No  need for ENT  eval/F/U,  * No  need for  Surgical  eval   *  for  EGD: --for Barretts screening / surveillance needed        2. NAFLD --> to be evaluated      would r/o HepB,C,CELESTINA,ASMA, AMA serologies      repeat LFTs, & Insulin level  Recommend:  * Low Fat / Low Carbohydrate diet was reviewed and emphasized  * avoid  alcohol and other potential hepatoxins was emphasized * Formal wt. reduction program & regular exercise was emphasized * Tight glycemic control was  emphasized   * Anti-oxidants: Omega-3A's  3 grams/D, Vit E 800 IU/D, Vit C 1000mg/D,  Vit D 2000 IU/D  was  emphasized 9/7/23 Labs: CELESTINA, AMA, ASMA, Ferritin, HCV, HBV--> WNL, Insulin=87, GGTP=22 9/14/23 Abd sono: Liver 15.4cm w increased echogenecity, spleen at 13.1 cm  5/9/24: AST=62, ALT=69, ALP=74, ALB=4.3, TB=0.4, HL=520, HDL=34,CRB=510, xXKq5E=5.5 * Monitor LFTs, FBG, HgbA1c, Lipids :    most recent labs were reviewed and diet/ treatment changes were discussed * Abd Sonogram--   most recent imaging findings were  reviewed Fibroscan--> after abd sono      3. Probable  Irritable Bowel Syndrome:  well - controlled. currently no  pain,  constipation,  diarrhea,  bloat Recommend:  * Diet: moderate  Fiber,  Low Fat & Lactose free, Low FODMAPs--was  emphasized * Daily fluid intake was reviewed : 6  --  8 cups of decaffeinated fluid daily was emphasized * Regular aerobic exercise was emphasized * Probiotics  1  daily * Neuromodulation: not currently required     4. Hemorrhoids:  well - controlled.  No pain,  swelling,  itch,  bleeding * Discussed  the  potential complications of thrombosis,  pain,  infection,  swelling, itching,  bleeding --none recently Recommendations:  * Moderate- Fiber Diet was  emphasized * 6  --  8 cups of decaffeinated fluid daily was emphasized  * Sitz Bathes as needed ,  No:  Anusol HC  Suppos / Cream  SD BID -- was needed * No:  Tucks BID,  Balneol Lotion,   Calmoseptine Oint -- was needed ;    can use  Prep H prn * No:  need for  Colorectal surgical evaluation for possible ablation        5. Colorectal  Neoplasia  Screening:  to be evaluated   Utilizing teaching posters and anatomical models the following were discussed and emphasized with the patient in detail:  * Discussed the pre-malignant potential of polyps * Discussed the importance of f/u surveillance / screening colonoscopy  * moderate- Fiber Diet was emphasized * Anti-oxidants and ASA/NSAID Therapy emphasized * Given age > 40-51 yo & +FH Colon Ca & +PH Colon Polyps * Recommend Colonoscopy  to  R/O  Colonic Neoplasia-- in 2026

## 2024-10-02 NOTE — REVIEW OF SYSTEMS
[Scleral Icterus (Yellow Eyes)] : no scleral icterus [As Noted in HPI] : as noted in HPI [Confused] : no confusion [Proptosis] : no proptosis [Easy Bruising] : no tendency for easy bruising [Negative] : Integumentary

## 2024-10-11 ENCOUNTER — APPOINTMENT (OUTPATIENT)
Dept: FAMILY MEDICINE | Facility: CLINIC | Age: 49
End: 2024-10-11

## 2024-10-11 VITALS
HEIGHT: 67 IN | OXYGEN SATURATION: 97 % | SYSTOLIC BLOOD PRESSURE: 130 MMHG | DIASTOLIC BLOOD PRESSURE: 80 MMHG | TEMPERATURE: 98 F | BODY MASS INDEX: 34.53 KG/M2 | HEART RATE: 86 BPM | WEIGHT: 220 LBS

## 2024-10-11 VITALS
HEIGHT: 67 IN | DIASTOLIC BLOOD PRESSURE: 80 MMHG | BODY MASS INDEX: 34.53 KG/M2 | OXYGEN SATURATION: 97 % | WEIGHT: 220 LBS | TEMPERATURE: 98 F | SYSTOLIC BLOOD PRESSURE: 130 MMHG | HEART RATE: 86 BPM

## 2024-10-11 DIAGNOSIS — M54.50 LOW BACK PAIN, UNSPECIFIED: ICD-10-CM

## 2024-10-11 DIAGNOSIS — R06.83 SNORING: ICD-10-CM

## 2024-10-11 DIAGNOSIS — Z23 ENCOUNTER FOR IMMUNIZATION: ICD-10-CM

## 2024-10-11 DIAGNOSIS — E66.9 OBESITY, UNSPECIFIED: ICD-10-CM

## 2024-10-11 PROCEDURE — 99214 OFFICE O/P EST MOD 30 MIN: CPT | Mod: 25

## 2024-10-11 PROCEDURE — G0008: CPT

## 2024-10-11 PROCEDURE — 90656 IIV3 VACC NO PRSV 0.5 ML IM: CPT

## 2024-10-31 ENCOUNTER — APPOINTMENT (OUTPATIENT)
Dept: GASTROENTEROLOGY | Facility: CLINIC | Age: 49
End: 2024-10-31
Payer: COMMERCIAL

## 2024-10-31 DIAGNOSIS — R13.10 DYSPHAGIA, UNSPECIFIED: ICD-10-CM

## 2024-10-31 DIAGNOSIS — K58.9 IRRITABLE BOWEL SYNDROME, UNSPECIFIED: ICD-10-CM

## 2024-10-31 DIAGNOSIS — K76.0 FATTY (CHANGE OF) LIVER, NOT ELSEWHERE CLASSIFIED: ICD-10-CM

## 2024-10-31 DIAGNOSIS — K21.9 GASTRO-ESOPHAGEAL REFLUX DISEASE W/OUT ESOPHAGITIS: ICD-10-CM

## 2024-10-31 DIAGNOSIS — K64.8 OTHER HEMORRHOIDS: ICD-10-CM

## 2024-10-31 DIAGNOSIS — Z12.11 ENCOUNTER FOR SCREENING FOR MALIGNANT NEOPLASM OF COLON: ICD-10-CM

## 2024-10-31 PROCEDURE — 99214 OFFICE O/P EST MOD 30 MIN: CPT

## 2024-11-11 ENCOUNTER — APPOINTMENT (OUTPATIENT)
Dept: FAMILY MEDICINE | Facility: CLINIC | Age: 49
End: 2024-11-11
Payer: COMMERCIAL

## 2024-11-11 VITALS
HEART RATE: 86 BPM | OXYGEN SATURATION: 97 % | WEIGHT: 220 LBS | BODY MASS INDEX: 34.53 KG/M2 | HEIGHT: 67 IN | DIASTOLIC BLOOD PRESSURE: 70 MMHG | SYSTOLIC BLOOD PRESSURE: 130 MMHG

## 2024-11-11 DIAGNOSIS — R79.89 OTHER SPECIFIED ABNORMAL FINDINGS OF BLOOD CHEMISTRY: ICD-10-CM

## 2024-11-11 DIAGNOSIS — K76.0 FATTY (CHANGE OF) LIVER, NOT ELSEWHERE CLASSIFIED: ICD-10-CM

## 2024-11-11 DIAGNOSIS — E66.9 OBESITY, UNSPECIFIED: ICD-10-CM

## 2024-11-11 PROCEDURE — 99214 OFFICE O/P EST MOD 30 MIN: CPT

## 2024-11-11 PROCEDURE — G2211 COMPLEX E/M VISIT ADD ON: CPT | Mod: NC

## 2024-11-11 PROCEDURE — 36415 COLL VENOUS BLD VENIPUNCTURE: CPT

## 2024-11-12 LAB
ALBUMIN SERPL ELPH-MCNC: 4.4 G/DL
ALP BLD-CCNC: 75 U/L
ALT SERPL-CCNC: 53 U/L
ANION GAP SERPL CALC-SCNC: 13 MMOL/L
AST SERPL-CCNC: 30 U/L
BILIRUB SERPL-MCNC: 0.4 MG/DL
BUN SERPL-MCNC: 16 MG/DL
CALCIUM SERPL-MCNC: 9.4 MG/DL
CHLORIDE SERPL-SCNC: 104 MMOL/L
CO2 SERPL-SCNC: 25 MMOL/L
CREAT SERPL-MCNC: 1.08 MG/DL
EGFR: 84 ML/MIN/1.73M2
ESTIMATED AVERAGE GLUCOSE: 120 MG/DL
GLUCOSE SERPL-MCNC: 83 MG/DL
HBA1C MFR BLD HPLC: 5.8 %
POTASSIUM SERPL-SCNC: 4.1 MMOL/L
PROT SERPL-MCNC: 6.9 G/DL
SODIUM SERPL-SCNC: 141 MMOL/L

## 2024-12-13 ENCOUNTER — APPOINTMENT (OUTPATIENT)
Dept: ENDOCRINOLOGY | Facility: CLINIC | Age: 49
End: 2024-12-13

## 2025-01-27 ENCOUNTER — TRANSCRIPTION ENCOUNTER (OUTPATIENT)
Age: 50
End: 2025-01-27

## 2025-01-31 ENCOUNTER — APPOINTMENT (OUTPATIENT)
Dept: GASTROENTEROLOGY | Facility: HOSPITAL | Age: 50
End: 2025-01-31

## 2025-05-12 ENCOUNTER — APPOINTMENT (OUTPATIENT)
Dept: FAMILY MEDICINE | Facility: CLINIC | Age: 50
End: 2025-05-12